# Patient Record
Sex: MALE | Race: WHITE | NOT HISPANIC OR LATINO | Employment: UNEMPLOYED | URBAN - METROPOLITAN AREA
[De-identification: names, ages, dates, MRNs, and addresses within clinical notes are randomized per-mention and may not be internally consistent; named-entity substitution may affect disease eponyms.]

---

## 2019-07-29 ENCOUNTER — HOSPITAL ENCOUNTER (EMERGENCY)
Facility: HOSPITAL | Age: 34
Discharge: HOME | End: 2019-07-29
Attending: FAMILY MEDICINE
Payer: MEDICAID

## 2019-07-29 VITALS
HEIGHT: 69 IN | BODY MASS INDEX: 25.06 KG/M2 | DIASTOLIC BLOOD PRESSURE: 100 MMHG | OXYGEN SATURATION: 99 % | HEART RATE: 73 BPM | SYSTOLIC BLOOD PRESSURE: 153 MMHG | WEIGHT: 169.19 LBS | RESPIRATION RATE: 20 BRPM | TEMPERATURE: 98 F

## 2019-07-29 DIAGNOSIS — R19.7 DIARRHEA, UNSPECIFIED TYPE: Primary | ICD-10-CM

## 2019-07-29 PROCEDURE — 99283 EMERGENCY DEPT VISIT LOW MDM: CPT

## 2019-07-29 RX ORDER — DIPHENOXYLATE HYDROCHLORIDE AND ATROPINE SULFATE 2.5; .025 MG/1; MG/1
1 TABLET ORAL 4 TIMES DAILY PRN
Qty: 16 TABLET | Refills: 0 | Status: SHIPPED | OUTPATIENT
Start: 2019-07-29 | End: 2019-08-08

## 2019-07-29 NOTE — ED NOTES
Patient examined, evaluated, and educated on discharge instructions and prescriptions by FLOWER Zelaya without nursing assistance. Patient discharged to lobby by NP.

## 2019-07-29 NOTE — ED PROVIDER NOTES
"Encounter Date: 7/29/2019       History     Chief Complaint   Patient presents with    Diarrhea     Pt states, "I have had diarrhea for the past 4 days and my butt hole hurts really bad."     Pt is a 35 y/o WM that presents complaining of diarrhea for the past 4 days. He was released from [facility] 2 weeks ago. He reports that 4 days ago he had diarrhea, nausea, and subjective fever. The nausea and fever subsided after the first 24-48 hours. Now he continues to have 3-5 loose stools/day and is complaining of rectal pain as well. He reports pain with defecating and wiping as well. He has noticed blood on the toilet paper but never in the toilet or mixed with feces. He had a similar episode to this when he was incarcerated in January of this year. He reports at that time he noticed a skin tag. He denies rectal trauma, abdominal pain, constipation, blood in stool, headache, dysuria.         Review of patient's allergies indicates:  No Known Allergies  No past medical history on file.  No past surgical history on file.  No family history on file.  Social History     Tobacco Use    Smoking status: Not on file   Substance Use Topics    Alcohol use: Not on file    Drug use: Not on file     Review of Systems   Constitutional: Negative for fever.   HENT: Negative for sore throat.    Respiratory: Negative for shortness of breath.    Cardiovascular: Negative for chest pain.   Gastrointestinal: Positive for anal bleeding, diarrhea and rectal pain. Negative for abdominal distention, abdominal pain, blood in stool, constipation, nausea and vomiting.   Genitourinary: Negative for dysuria.   Musculoskeletal: Negative for back pain.   Skin: Negative for rash.   Neurological: Negative for weakness.   Hematological: Does not bruise/bleed easily.       Physical Exam     Initial Vitals [07/29/19 1055]   BP Pulse Resp Temp SpO2   (!) 153/100 73 20 98.1 °F (36.7 °C) 99 %      MAP       --         Physical Exam    Constitutional: He " appears well-developed and well-nourished.   HENT:   Head: Normocephalic and atraumatic.   Eyes: Conjunctivae are normal. Pupils are equal, round, and reactive to light.   Neck: Normal range of motion. Neck supple.   Cardiovascular: Normal rate, regular rhythm, normal heart sounds and intact distal pulses.   Pulmonary/Chest: Breath sounds normal.   Abdominal: Soft. Bowel sounds are normal. He exhibits no distension. There is no tenderness. There is no rebound and no guarding.   Genitourinary: Rectal exam shows tenderness. Rectal exam shows no external hemorrhoid, no internal hemorrhoid, no fissure and anal tone normal.   Genitourinary Comments: Skin tag noted at superior aspect of rectum.   Musculoskeletal: Normal range of motion.   Neurological: He is alert.   Skin: Skin is warm and dry.   Psychiatric: He has a normal mood and affect. His behavior is normal. Thought content normal.         ED Course   Procedures  Labs Reviewed   HIV 1 / 2 ANTIBODY          Imaging Results    None                               Clinical Impression:       ICD-10-CM ICD-9-CM   1. Diarrhea, unspecified type R19.7 787.91                                Garcia Lawrence NP  07/29/19 1143

## 2021-03-06 ENCOUNTER — HOSPITAL ENCOUNTER (EMERGENCY)
Facility: HOSPITAL | Age: 36
Discharge: HOME | End: 2021-03-06
Attending: EMERGENCY MEDICINE
Payer: COMMERCIAL

## 2021-03-06 VITALS
DIASTOLIC BLOOD PRESSURE: 78 MMHG | HEART RATE: 69 BPM | HEIGHT: 69 IN | RESPIRATION RATE: 16 BRPM | TEMPERATURE: 99 F | SYSTOLIC BLOOD PRESSURE: 123 MMHG | OXYGEN SATURATION: 100 % | BODY MASS INDEX: 24.52 KG/M2 | WEIGHT: 165.56 LBS
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
## 2021-03-06 PROCEDURE — 99284 EMERGENCY DEPT VISIT MOD MDM: CPT | Mod: 25

## 2021-03-06 RX ORDER — CYCLOBENZAPRINE HCL 10 MG
10 TABLET ORAL 3 TIMES DAILY PRN
Qty: 15 TABLET | Refills: 0 | Status: SHIPPED | OUTPATIENT
Start: 2021-03-06 | End: 2021-03-11

## 2021-03-06 RX ORDER — DICLOFENAC SODIUM 50 MG/1
50 TABLET, DELAYED RELEASE ORAL 2 TIMES DAILY
Qty: 10 TABLET | Refills: 0 | Status: SHIPPED | OUTPATIENT
Start: 2021-03-06 | End: 2021-03-11